# Patient Record
Sex: MALE | Race: WHITE | NOT HISPANIC OR LATINO | URBAN - METROPOLITAN AREA
[De-identification: names, ages, dates, MRNs, and addresses within clinical notes are randomized per-mention and may not be internally consistent; named-entity substitution may affect disease eponyms.]

---

## 2019-05-29 ENCOUNTER — INPATIENT (INPATIENT)
Facility: HOSPITAL | Age: 69
LOS: 0 days | Discharge: HOME | DRG: 727 | End: 2019-05-30
Attending: INTERNAL MEDICINE | Admitting: INTERNAL MEDICINE
Payer: COMMERCIAL

## 2019-05-29 VITALS
RESPIRATION RATE: 18 BRPM | HEIGHT: 70 IN | HEART RATE: 97 BPM | WEIGHT: 175.93 LBS | TEMPERATURE: 98 F | DIASTOLIC BLOOD PRESSURE: 87 MMHG | OXYGEN SATURATION: 95 % | SYSTOLIC BLOOD PRESSURE: 159 MMHG

## 2019-05-29 DIAGNOSIS — Z29.9 ENCOUNTER FOR PROPHYLACTIC MEASURES, UNSPECIFIED: ICD-10-CM

## 2019-05-29 DIAGNOSIS — I10 ESSENTIAL (PRIMARY) HYPERTENSION: ICD-10-CM

## 2019-05-29 DIAGNOSIS — N49.2 INFLAMMATORY DISORDERS OF SCROTUM: ICD-10-CM

## 2019-05-29 DIAGNOSIS — Z91.89 OTHER SPECIFIED PERSONAL RISK FACTORS, NOT ELSEWHERE CLASSIFIED: ICD-10-CM

## 2019-05-29 DIAGNOSIS — I26.99 OTHER PULMONARY EMBOLISM WITHOUT ACUTE COR PULMONALE: ICD-10-CM

## 2019-05-29 DIAGNOSIS — N18.9 CHRONIC KIDNEY DISEASE, UNSPECIFIED: ICD-10-CM

## 2019-05-29 DIAGNOSIS — N50.3 CYST OF EPIDIDYMIS: ICD-10-CM

## 2019-05-29 DIAGNOSIS — R82.71 BACTERIURIA: ICD-10-CM

## 2019-05-29 LAB
ALBUMIN SERPL ELPH-MCNC: 4.7 G/DL — SIGNIFICANT CHANGE UP (ref 3.3–5)
ALP SERPL-CCNC: 91 U/L — SIGNIFICANT CHANGE UP (ref 40–120)
ALT FLD-CCNC: 34 U/L — SIGNIFICANT CHANGE UP (ref 10–45)
ANION GAP SERPL CALC-SCNC: 15 MMOL/L — SIGNIFICANT CHANGE UP (ref 5–17)
APPEARANCE UR: ABNORMAL
APTT BLD: 27.7 SEC — SIGNIFICANT CHANGE UP (ref 27.5–36.3)
AST SERPL-CCNC: 38 U/L — SIGNIFICANT CHANGE UP (ref 10–40)
BASOPHILS # BLD AUTO: 0.03 K/UL — SIGNIFICANT CHANGE UP (ref 0–0.2)
BASOPHILS NFR BLD AUTO: 0.2 % — SIGNIFICANT CHANGE UP (ref 0–2)
BILIRUB SERPL-MCNC: 1 MG/DL — SIGNIFICANT CHANGE UP (ref 0.2–1.2)
BILIRUB UR-MCNC: NEGATIVE — SIGNIFICANT CHANGE UP
BUN SERPL-MCNC: 13 MG/DL — SIGNIFICANT CHANGE UP (ref 7–23)
CALCIUM SERPL-MCNC: 10.1 MG/DL — SIGNIFICANT CHANGE UP (ref 8.4–10.5)
CHLORIDE SERPL-SCNC: 98 MMOL/L — SIGNIFICANT CHANGE UP (ref 96–108)
CO2 SERPL-SCNC: 26 MMOL/L — SIGNIFICANT CHANGE UP (ref 22–31)
COLOR SPEC: YELLOW — SIGNIFICANT CHANGE UP
CREAT SERPL-MCNC: 1.28 MG/DL — SIGNIFICANT CHANGE UP (ref 0.5–1.3)
DIFF PNL FLD: ABNORMAL
EOSINOPHIL # BLD AUTO: 0.05 K/UL — SIGNIFICANT CHANGE UP (ref 0–0.5)
EOSINOPHIL NFR BLD AUTO: 0.3 % — SIGNIFICANT CHANGE UP (ref 0–6)
GLUCOSE SERPL-MCNC: 116 MG/DL — HIGH (ref 70–99)
GLUCOSE UR QL: NEGATIVE — SIGNIFICANT CHANGE UP
HBA1C BLD-MCNC: 5.4 % — SIGNIFICANT CHANGE UP (ref 4–5.6)
HCT VFR BLD CALC: 46.8 % — SIGNIFICANT CHANGE UP (ref 39–50)
HGB BLD-MCNC: 15.8 G/DL — SIGNIFICANT CHANGE UP (ref 13–17)
IMM GRANULOCYTES NFR BLD AUTO: 0.4 % — SIGNIFICANT CHANGE UP (ref 0–1.5)
INR BLD: 1.09 — SIGNIFICANT CHANGE UP (ref 0.88–1.16)
KETONES UR-MCNC: 15 MG/DL
LEUKOCYTE ESTERASE UR-ACNC: NEGATIVE — SIGNIFICANT CHANGE UP
LYMPHOCYTES # BLD AUTO: 1.9 K/UL — SIGNIFICANT CHANGE UP (ref 1–3.3)
LYMPHOCYTES # BLD AUTO: 11.8 % — LOW (ref 13–44)
MCHC RBC-ENTMCNC: 31.7 PG — SIGNIFICANT CHANGE UP (ref 27–34)
MCHC RBC-ENTMCNC: 33.8 GM/DL — SIGNIFICANT CHANGE UP (ref 32–36)
MCV RBC AUTO: 93.8 FL — SIGNIFICANT CHANGE UP (ref 80–100)
MONOCYTES # BLD AUTO: 1.39 K/UL — HIGH (ref 0–0.9)
MONOCYTES NFR BLD AUTO: 8.6 % — SIGNIFICANT CHANGE UP (ref 2–14)
NEUTROPHILS # BLD AUTO: 12.73 K/UL — HIGH (ref 1.8–7.4)
NEUTROPHILS NFR BLD AUTO: 78.7 % — HIGH (ref 43–77)
NITRITE UR-MCNC: NEGATIVE — SIGNIFICANT CHANGE UP
NRBC # BLD: 0 /100 WBCS — SIGNIFICANT CHANGE UP (ref 0–0)
PH UR: 6 — SIGNIFICANT CHANGE UP (ref 5–8)
PLATELET # BLD AUTO: 246 K/UL — SIGNIFICANT CHANGE UP (ref 150–400)
POTASSIUM SERPL-MCNC: 3.8 MMOL/L — SIGNIFICANT CHANGE UP (ref 3.5–5.3)
POTASSIUM SERPL-SCNC: 3.8 MMOL/L — SIGNIFICANT CHANGE UP (ref 3.5–5.3)
PROT SERPL-MCNC: 8.1 G/DL — SIGNIFICANT CHANGE UP (ref 6–8.3)
PROT UR-MCNC: 30 MG/DL
PROTHROM AB SERPL-ACNC: 12.4 SEC — SIGNIFICANT CHANGE UP (ref 10–12.9)
RBC # BLD: 4.99 M/UL — SIGNIFICANT CHANGE UP (ref 4.2–5.8)
RBC # FLD: 13.9 % — SIGNIFICANT CHANGE UP (ref 10.3–14.5)
SODIUM SERPL-SCNC: 139 MMOL/L — SIGNIFICANT CHANGE UP (ref 135–145)
SP GR SPEC: 1.02 — SIGNIFICANT CHANGE UP (ref 1–1.03)
TSH SERPL-MCNC: 1.51 UIU/ML — SIGNIFICANT CHANGE UP (ref 0.35–4.94)
UROBILINOGEN FLD QL: 0.2 E.U./DL — SIGNIFICANT CHANGE UP
WBC # BLD: 16.17 K/UL — HIGH (ref 3.8–10.5)
WBC # FLD AUTO: 16.17 K/UL — HIGH (ref 3.8–10.5)

## 2019-05-29 PROCEDURE — 71046 X-RAY EXAM CHEST 2 VIEWS: CPT | Mod: 26

## 2019-05-29 PROCEDURE — 99223 1ST HOSP IP/OBS HIGH 75: CPT | Mod: GC

## 2019-05-29 PROCEDURE — 93970 EXTREMITY STUDY: CPT | Mod: 26

## 2019-05-29 PROCEDURE — 71275 CT ANGIOGRAPHY CHEST: CPT | Mod: 26

## 2019-05-29 PROCEDURE — 76870 US EXAM SCROTUM: CPT | Mod: 26

## 2019-05-29 PROCEDURE — 99285 EMERGENCY DEPT VISIT HI MDM: CPT

## 2019-05-29 RX ORDER — ENOXAPARIN SODIUM 100 MG/ML
80 INJECTION SUBCUTANEOUS ONCE
Refills: 0 | Status: COMPLETED | OUTPATIENT
Start: 2019-05-29 | End: 2019-05-29

## 2019-05-29 RX ORDER — CEPHALEXIN 500 MG
500 CAPSULE ORAL
Refills: 0 | Status: DISCONTINUED | OUTPATIENT
Start: 2019-05-29 | End: 2019-05-30

## 2019-05-29 RX ORDER — HYDROCHLOROTHIAZIDE 25 MG
25 TABLET ORAL DAILY
Refills: 0 | Status: DISCONTINUED | OUTPATIENT
Start: 2019-05-29 | End: 2019-05-30

## 2019-05-29 RX ORDER — APIXABAN 2.5 MG/1
10 TABLET, FILM COATED ORAL EVERY 12 HOURS
Refills: 0 | Status: DISCONTINUED | OUTPATIENT
Start: 2019-05-30 | End: 2019-05-30

## 2019-05-29 RX ORDER — AMPICILLIN SODIUM AND SULBACTAM SODIUM 250; 125 MG/ML; MG/ML
3 INJECTION, POWDER, FOR SUSPENSION INTRAMUSCULAR; INTRAVENOUS ONCE
Refills: 0 | Status: COMPLETED | OUTPATIENT
Start: 2019-05-29 | End: 2019-05-29

## 2019-05-29 RX ORDER — ZOLPIDEM TARTRATE 10 MG/1
1 TABLET ORAL
Qty: 0 | Refills: 0 | DISCHARGE

## 2019-05-29 RX ORDER — KETOROLAC TROMETHAMINE 30 MG/ML
15 SYRINGE (ML) INJECTION ONCE
Refills: 0 | Status: DISCONTINUED | OUTPATIENT
Start: 2019-05-29 | End: 2019-05-29

## 2019-05-29 RX ADMIN — Medication 15 MILLIGRAM(S): at 15:22

## 2019-05-29 RX ADMIN — Medication 25 MILLIGRAM(S): at 22:55

## 2019-05-29 RX ADMIN — AMPICILLIN SODIUM AND SULBACTAM SODIUM 200 GRAM(S): 250; 125 INJECTION, POWDER, FOR SUSPENSION INTRAMUSCULAR; INTRAVENOUS at 18:21

## 2019-05-29 RX ADMIN — Medication 15 MILLIGRAM(S): at 14:22

## 2019-05-29 RX ADMIN — ENOXAPARIN SODIUM 80 MILLIGRAM(S): 100 INJECTION SUBCUTANEOUS at 17:34

## 2019-05-29 NOTE — H&P ADULT - PROBLEM SELECTOR PLAN 7
F: none  E: Replete K<4, Mg <2 PRN   N: AUSTIN/TLC   DVT: Lovenox 80mg now Eliquis   Code status: Full   Dispo: RMF States SBP 130s at home.   -C/w HCTZ 25mg qd

## 2019-05-29 NOTE — H&P ADULT - PROBLEM SELECTOR PLAN 1
Pt presented with acute pleuritic CP, now with CT of subsegmental PE no right heart strain (neg trops, BNP). VSS on RA. Unprovoked PE though has significant smoking/exposure hx. S/p Lovenox 80mg in ED. As Pt has no evidence of HD compromise/cor pulm will transition to Eliquis (Cre clearance acceptable)   -C/w Eliquis 10mg BID for 7d then 5mg BID  -F/u LE dopplers, echocardiogram Pt presented with acute pleuritic CP, now with CT of subsegmental PE no right heart strain (neg trops, BNP). VSS on RA. Unprovoked PE though has significant smoking/exposure hx. S/p Lovenox 80mg in ED. As Pt has no evidence of HD compromise/cor pulm will transition to Eliquis (Cre clearance acceptable)   -C/w Eliquis 10mg BID for 7d then 5mg BID  -F/u AM EKG  -F/u LE dopplers, echocardiogram

## 2019-05-29 NOTE — ED PROVIDER NOTE - OBJECTIVE STATEMENT
68 yo M with pmh of HTN c/o R upper back pain x 2 days. Pt states pain is worse when he lies on his back or when he takes a deep breath. Took tylenol which helps with the pain. Denies trauma. Reports swimming for 10 min earlier in the day before the pain started but did not do anything out of the ordinary. Denies fever, chills, cough, sob, cp, sweats, Le swelling, travel prior to pain starting. Pt also c/o testicular swelling since this morning. Pt woke up and his scrotum was red and swollen. Denies pain, dysuria, hematuria, discharge. Pt not sexually active.

## 2019-05-29 NOTE — H&P ADULT - PROBLEM SELECTOR PLAN 6
States SBP 130s at home.   -C/w HCTZ 25mg qd UA with protein, ketones, bacteria. No urinary complaints, unlikely UTI.  -UCx sent

## 2019-05-29 NOTE — H&P ADULT - PROBLEM SELECTOR PLAN 9
1) PCP Contacted on Admission: (Y/N) --> Name & Phone #: Bradly Delcid [phone]  2) Date of Contact with PCP:  3) PCP Contacted at Discharge: (Y/N, N/A)  4) Summary of Handoff Given to PCP:   5) Post-Discharge Appointment Date and Location:

## 2019-05-29 NOTE — H&P ADULT - NSHPLABSRESULTS_GEN_ALL_CORE
.  LABS:                         15.8   16.17 )-----------( 246      ( 29 May 2019 14:24 )             46.8         139  |  98  |  13  ----------------------------<  116<H>  3.8   |  26  |  1.28    Ca    10.1      29 May 2019 14:24    TPro  8.1  /  Alb  4.7  /  TBili  1.0  /  DBili  x   /  AST  38  /  ALT  34  /  AlkPhos  91      PT/INR - ( 29 May 2019 14:24 )   PT: 12.4 sec;   INR: 1.09          PTT - ( 29 May 2019 14:24 )  PTT:27.7 sec  Urinalysis Basic - ( 29 May 2019 15:29 )    Color: Yellow / Appearance: SL Cloudy / S.025 / pH: x  Gluc: x / Ketone: 15 mg/dL  / Bili: Negative / Urobili: 0.2 E.U./dL   Blood: x / Protein: 30 mg/dL / Nitrite: NEGATIVE   Leuk Esterase: NEGATIVE / RBC: < 5 /HPF / WBC < 5 /HPF   Sq Epi: x / Non Sq Epi: 0-5 /HPF / Bacteria: Present /HPF      CARDIAC MARKERS ( 29 May 2019 14:24 )  x     / <0.01 ng/mL / x     / x     / x          Serum Pro-Brain Natriuretic Peptide: 70 pg/mL ( @ 14:24)        < from: CT Angio Chest PE Protocol w/ IV Cont (19 @ 15:43) >    IMPRESSION:     Right segmental and subsegmental pulmonary embolus. No right heart strain.    Right lower lobe airspace opacity, probably developing pulmonary infarct.        < end of copied text >    < from: US Testicles (19 @ 16:48) >      IMPRESSION:  No evidence of testicular torsion.    0.3 x 0.2 x 0.2 cm hyperechoic rounded lesion within the left testicle of   uncertain etiology, but suspicious for a lipoma. 0.2 x 0.2 x 0.2 cm   hypoechoic lesion within the right testicle may represent a complex cyst   or a necrotic lesion.  Follow-up MRI may be helpful for further   characterization.    2.5 x 1.5 x 1.9 cm left epididymal tail cyst versus spermatocele.    Scrotal wall thickening and mild hyperemia may represent cellulitis,   correlate clinically.      < end of copied text >

## 2019-05-29 NOTE — H&P ADULT - NSHPPHYSICALEXAM_GEN_ALL_CORE
.  VITAL SIGNS:  T(C): 36.8 (05-29-19 @ 13:31), Max: 36.8 (05-29-19 @ 13:31)  T(F): 98.3 (05-29-19 @ 13:31), Max: 98.3 (05-29-19 @ 13:31)  HR: 97 (05-29-19 @ 13:31) (97 - 97)  BP: 159/87 (05-29-19 @ 13:31) (159/87 - 159/87)  BP(mean): --  RR: 18 (05-29-19 @ 13:31) (18 - 18)  SpO2: 95% (05-29-19 @ 13:31) (95% - 95%)  Wt(kg): --    PHYSICAL EXAM:    Constitutional: WDWN resting comfortably in bed; NAD not wearing NC  Head: NC/AT  Eyes: PERRL, EOMI, anicteric sclera  ENT: no nasal discharge; uvula midline, no oropharyngeal erythema or exudates; dry tongue  Neck: supple; no JVD or thyromegaly  Respiratory: RR 14, no SoB in full sentences, crackles at RLL otherwise CTA no WoB  Cardiac: +S1/S2; RRR; no M/R/G; PMI non-displaced  Gastrointestinal: soft, NT/ND; no rebound or guarding; +BSx4  Genitourinary: deferred   Back: spine midline, no bony tenderness or step-offs; no CVAT B/L  Extremities: WWP, no clubbing or cyanosis; no peripheral edema  Musculoskeletal: NROM x4; no joint swelling, tenderness or erythema  Vascular: 2+ radial, femoral, DP/PT pulses B/L  Dermatologic: skin warm, dry and intact; no rashes, wounds, or scars; very dry sun-tanned skin   Lymphatic: no submandibular or cervical LAD  Neurologic: AAOx3; CNII-XII grossly intact; no focal deficits  Psychiatric: affect and characteristics of appearance, verbalizations, behaviors are appropriate

## 2019-05-29 NOTE — PATIENT PROFILE ADULT - STATED REASON FOR ADMISSION
complained of R sided Axillary/ chest pain that woke him up from sleep 3 days PTA, also scrotal pain and swelling which he noted while staying at his mothers house also noted like a bite to the thigh are near groin

## 2019-05-29 NOTE — PATIENT PROFILE ADULT - NSPROEDALEARNPREF_GEN_A_NUR
group instruction/written material/individual instruction/video/verbal instruction/pictorial/skill demonstration

## 2019-05-29 NOTE — ED PROVIDER NOTE - PHYSICAL EXAMINATION
CONSTITUTIONAL: Well-appearing; well-nourished; in no apparent distress.   HEAD: Normocephalic; atraumatic.   EYES: PERRL; EOM intact; conjunctiva and sclera clear  ENT: normal nose; no rhinorrhea; normal pharynx with no erythema or lesions.   NECK: Supple; non-tender; no LAD  CARDIOVASCULAR: Normal S1, S2; no murmurs, rubs, or gallops. Regular rate and rhythm.   RESPIRATORY: Breathing easily; breath sounds clear and equal bilaterally; no wheezes, rhonchi, or rales.  GI: Soft; non-distended; non-tender; no palpable organomegaly.   : +scrotal swelling  and redness, nontender, no warmth   MSK: FROM at all extremities, normal tone; R upper back non tender  EXT: No cyanosis or edema; N/V intact  SKIN: Normal for age and race; warm; dry; good turgor; no apparent lesions or rash.   NEURO: A & O x 3; face symmetric; grossly unremarkable.   PSYCHOLOGICAL: The patient’s mood and manner are appropriate.

## 2019-05-29 NOTE — H&P ADULT - PROBLEM SELECTOR PLAN 8
1) PCP Contacted on Admission: (Y/N) --> Name & Phone #: Bradly Delcid [phone]  2) Date of Contact with PCP:  3) PCP Contacted at Discharge: (Y/N, N/A)  4) Summary of Handoff Given to PCP:   5) Post-Discharge Appointment Date and Location: F: none  E: Replete K<4, Mg <2 PRN   N: AUSTIN/TLC   DVT: Lovenox 80mg now Eliquis   Code status: Full   Dispo: RMF

## 2019-05-29 NOTE — ED ADULT TRIAGE NOTE - CHIEF COMPLAINT QUOTE
pt c/o R sided back pain since Monday night. pt also states 'I woke up this morning and my testicles are really red and swollen." denies testicular pain. denies urinary symptoms, fever, chest pain, sob.

## 2019-05-29 NOTE — ED PROVIDER NOTE - CLINICAL SUMMARY MEDICAL DECISION MAKING FREE TEXT BOX
70 yo M with pmh of HTN c/o R upper back pain x 2 days. Worse with movements and deep breaths. No cp, sob. Also with scrotal swelling since this morning. VSS. Well appearing. +scrotal swelling  and redness, nontender, no warmth;  FROM at all extremities, normal tone; R upper back non tender; reproduced with lying back 70 yo M with pmh of HTN c/o R upper back pain x 2 days. Worse with movements and deep breaths. No cp, sob. Also with scrotal swelling since this morning. VSS. Well appearing. +scrotal swelling  and redness, nontender, no warmth;  FROM at all extremities, normal tone; R upper back non tender; reproduced with lying back. r/o pna r/o PE r/o MSK pain, r/o UTI r/o epididymitis

## 2019-05-29 NOTE — H&P ADULT - PROBLEM SELECTOR PLAN 2
C/o sudden onset erythema/swelling. U/s with scrotal wall thickening and mild hyperemia may represent cellulitis. No torsion. Afebrile, WBC 16. Exam deferred at this time since Pt in ED. No recent abx or risk factors. S/p Unasyn in ED  -Will ask Night Team to examine scrotum for crepitis once Pt has room   -C/w Keflex for likely nonpurulent cellulitis 500 q6hr for 5d as above

## 2019-05-29 NOTE — ED PROVIDER NOTE - ATTENDING CONTRIBUTION TO CARE
69M with hx of hTN, complaining of R upper back pain, starting on Monday night. pain is worse when he lies down or with a deep breath. Pt took Tylenol which greatly helps pain. No chest pain, no sob, no fever/chils or cough. Pain started prior to traveling. Pt has testicular swelling. not painful no dysuria, not currently sexually active. Pt is well appearing. Testicle is nontender. WBC count elevated, XR shows possible R sided consolidation Plan for US of testicles, plan for CTA of chest.

## 2019-05-29 NOTE — H&P ADULT - PROBLEM SELECTOR PLAN 5
UA with protein, ketones, bacteria. No urinary complaints, unlikely UTI. UA with protein, ketones, bacteria. No urinary complaints, unlikely UTI.  -UCx sent Was told by PCP he has CKD but not to worry about it. Risk factors include HTN. Has protein on UA. No urinary complaints  -F/u A1c

## 2019-05-29 NOTE — H&P ADULT - PROBLEM SELECTOR PLAN 4
Was told by PCP he has CKD but not to worry about it. Risk factors include HTN. Has protein on UA. No urinary complaints  -F/u A1c hyperechoic rounded lesion within the left testicle of   uncertain etiology, but suspicious for a lipoma. 0.2 x 0.2 x 0.2 cm   hypoechoic lesion within the right testicle may represent a complex cyst   or a necrotic lesion.    -Will dicuss with Pt and recommend f/u MRI outpt

## 2019-05-29 NOTE — H&P ADULT - HISTORY OF PRESENT ILLNESS
70 yo M with pmh of HTN c/o R upper back pain x 2 days. Pt states pain is worse when he lies on his back or when he takes a deep breath. Took tylenol which helps with the pain. Denies trauma. Reports swimming for 10 min earlier in the day before the pain started but did not do anything out of the ordinary. Denies fever, chills, cough, sob, cp, sweats, Le swelling, travel prior to pain starting. Pt also c/o testicular swelling since this morning. Pt woke up and his scrotum was red and swollen. Denies pain, dysuria, hematuria, discharge. Pt not sexually active. 69M PMH HTN, CKD? unknown baseline presented for right sided back-axillary chest pain that woke him from sleep 3 days before admission. Pain is sharp, worse with deep breathing and lying on his back and relieved with tylenol.  Denies SoB, CP, HA, blurry vision, leg swelling. Today Pt woke up with testicular pain and swelling starting day of admission and has noticed insect? bite marks on medial thigh so he brought himself in. No trauma, denies  fever, chills, cough, sob, CP/pressure,  sweats, leg swelling, n/v/d dysuria. Endorses chronic constipation, under care of PCP with plans for repeat colonoscopy (last colonoscopy 5yr ago WNL).  Pt lives in [place] and flew to [facility] after pain started. 69M PMH HTN, CKD? unknown baseline presented for right sided back-axillary chest pain that woke him from sleep 3 days before admission. Pain is sharp, worse with deep breathing and lying on his back and relieved with tylenol.  Denies SoB, CP, HA, blurry vision, leg swelling. Today Pt woke up with testicular pain and swelling starting day of admission and has noticed insect? bite marks on medial thigh so he brought himself in. No trauma, denies  fever, chills, cough, sob, CP/pressure,  sweats, leg swelling, n/v/d dysuria. Endorses chronic constipation, under care of PCP with plans for repeat colonoscopy (last colonoscopy 5yr ago WNL).  Pt lives in [place] and flew to [facility] after pain started. Has a active lifestyle (plays tennis 5x/wk) and has not noticed any shortness of breath or changes to baseline routine.      ED Course:   -Vitals: T 98.3, HR 97, 159/87, 18, 95% on RA  -Notable Labs: 16.17%, neutrophilic predom  -CT-PE: Right segmental and subsegmental pulmonary embolus. No right heart strain, RLL airspace opacity, probably developing pulmonary infarct.   -UA: protein, ketones, bacteria   -Testicular u/s: cellulitis? 69M PMH HTN, CKD? unknown baseline presented for right sided back-axillary chest pain that woke him from sleep 3 days before admission. Pain is sharp, worse with deep breathing and lying on his back and relieved with tylenol.  Denies SoB, CP, HA, blurry vision, leg swelling. Today Pt woke up with testicular pain and swelling starting day of admission and has noticed insect? bite marks on medial thigh so he brought himself in. No trauma, denies  fever, chills, cough, sob, CP/pressure,  sweats, leg swelling, n/v/d dysuria. Endorses chronic constipation, under care of PCP with plans for repeat colonoscopy (last colonoscopy 5yr ago WNL).  Pt lives in [place] and flew to [facility] after pain started. Has a active lifestyle (plays tennis 5x/wk) and has not noticed any shortness of breath or changes to baseline routine. No recent illnesses, never hospitalized. Sees PCP yearly.      ED Course:   -Vitals: T 98.3, HR 97, 159/87, 18, 95% on RA  -Notable Labs: 16.17%, neutrophilic predom  -CT-PE: Right segmental and subsegmental pulmonary embolus. No right heart strain, RLL airspace opacity, probably developing pulmonary infarct.   -UA: protein, ketones, bacteria   -Testicular u/s: cellulitis? 69M PMH HTN, CKD? unknown baseline presented for right sided back-axillary chest pain that woke him from sleep 3 days before admission. Pain is sharp, worse with deep breathing and lying on his back and relieved with tylenol.  Denies SoB, CP, HA, blurry vision, leg swelling. Today Pt woke up with testicular pain and swelling starting day of admission and has noticed insect? bite marks on medial thigh so he brought himself in. No trauma, denies  fever, chills, cough, sob, CP/pressure,  sweats, leg swelling, n/v/d dysuria. Endorses chronic constipation, under care of PCP with plans for repeat colonoscopy (last colonoscopy 5yr ago WNL).  Pt lives in [place] and flew to [facility] after pain started. Has a active lifestyle (plays tennis 5x/wk) and has not noticed any shortness of breath or changes to baseline routine. No recent illnesses, never hospitalized. Sees PCP yearly.      ED Course:   -Vitals: T 98.3, HR 97, 159/87, 18, 95% on RA  -Notable Labs: 16.17%, neutrophilic predom  EKG: SNR, no ischemic changes or TWI, normal axis  -CT-PE: Right segmental and subsegmental pulmonary embolus. No right heart strain, RLL airspace opacity, probably developing pulmonary infarct.   -UA: protein, ketones, bacteria   -Testicular u/s: cellulitis?

## 2019-05-29 NOTE — H&P ADULT - ASSESSMENT
69M PMH HTN, CKD? unknown baseline presented for right sided back-axillary chest pain that woke him from sleep 3 days before admission now with CT evidence of unprovoked right segmental PE without right heart strain.

## 2019-05-29 NOTE — H&P ADULT - ATTENDING COMMENTS
patient seen and examined; reviewed: H&P, labs, radiology imaging/ reports, EKG     PE findings as above in addition on  exam pt w/ urticaria like lesions at inner thighs b/l ; also w/ mild  scrotal edema and erythema , nontender to palpation , NO crepitus     1. PE: cause unclear: ordered for NOAC after given lovenox , followup ECHO and dopplers of lower ext   2. cellulitis of scrotum: suspected, possibly from insect bites vs hives from stress of back pain; c/w keflex  3. followup w/ PCP/ urology re: testicular sono  findings       rest of plan as above

## 2019-05-29 NOTE — H&P ADULT - PROBLEM SELECTOR PLAN 3
hyperechoic rounded lesion within the left testicle of   uncertain etiology, but suspicious for a lipoma. 0.2 x 0.2 x 0.2 cm   hypoechoic lesion within the right testicle may represent a complex cyst   or a necrotic lesion.    -Will dicuss with Pt and recommend f/u MRI outpt C/o sudden onset erythema/swelling. U/s with scrotal wall thickening and mild hyperemia may represent cellulitis. No torsion. Afebrile, WBC 16. Exam deferred at this time since Pt in ED. No recent abx or risk factors. S/p Unasyn in ED  -Will ask Night Team to examine scrotum for crepitis once Pt has room   -C/w Keflex for likely nonpurulent cellulitis 500 q6hr for 5d

## 2019-05-29 NOTE — H&P ADULT - NSHPSOCIALHISTORY_GEN_ALL_CORE
-Lives with wife.   -Works out daily.   -Used to work in [employer] in [place], possible exposure to toxic fumes.   -Used to travel to [place] many times a year for work  -Has smoked for 50 years ranging from 1ppd to several cigs a day (last use last year). Social drinker. Past cocaine use in youth.

## 2019-05-29 NOTE — ED ADULT NURSE NOTE - OBJECTIVE STATEMENT
pt having redness/swelling of scrotum since this am ,denies pain , also having pain in right posterior ribcage area since Monday , was swimming that day and pain started that pm , pt is also concerned that he has not had  a normal BM x 2 weeks

## 2019-05-30 VITALS
SYSTOLIC BLOOD PRESSURE: 127 MMHG | HEART RATE: 87 BPM | DIASTOLIC BLOOD PRESSURE: 76 MMHG | RESPIRATION RATE: 18 BRPM | TEMPERATURE: 98 F | OXYGEN SATURATION: 94 %

## 2019-05-30 DIAGNOSIS — I26.99 OTHER PULMONARY EMBOLISM WITHOUT ACUTE COR PULMONALE: ICD-10-CM

## 2019-05-30 LAB
ANION GAP SERPL CALC-SCNC: 13 MMOL/L — SIGNIFICANT CHANGE UP (ref 5–17)
BUN SERPL-MCNC: 21 MG/DL — SIGNIFICANT CHANGE UP (ref 7–23)
CALCIUM SERPL-MCNC: 9.7 MG/DL — SIGNIFICANT CHANGE UP (ref 8.4–10.5)
CHLORIDE SERPL-SCNC: 99 MMOL/L — SIGNIFICANT CHANGE UP (ref 96–108)
CO2 SERPL-SCNC: 27 MMOL/L — SIGNIFICANT CHANGE UP (ref 22–31)
CREAT SERPL-MCNC: 1.36 MG/DL — HIGH (ref 0.5–1.3)
CULTURE RESULTS: NO GROWTH — SIGNIFICANT CHANGE UP
GLUCOSE SERPL-MCNC: 101 MG/DL — HIGH (ref 70–99)
HCT VFR BLD CALC: 41.8 % — SIGNIFICANT CHANGE UP (ref 39–50)
HCV AB S/CO SERPL IA: 0.02 S/CO — SIGNIFICANT CHANGE UP
HCV AB SERPL-IMP: SIGNIFICANT CHANGE UP
HGB BLD-MCNC: 13.8 G/DL — SIGNIFICANT CHANGE UP (ref 13–17)
MAGNESIUM SERPL-MCNC: 2 MG/DL — SIGNIFICANT CHANGE UP (ref 1.6–2.6)
MCHC RBC-ENTMCNC: 30.7 PG — SIGNIFICANT CHANGE UP (ref 27–34)
MCHC RBC-ENTMCNC: 33 GM/DL — SIGNIFICANT CHANGE UP (ref 32–36)
MCV RBC AUTO: 93.1 FL — SIGNIFICANT CHANGE UP (ref 80–100)
NRBC # BLD: 0 /100 WBCS — SIGNIFICANT CHANGE UP (ref 0–0)
PLATELET # BLD AUTO: 216 K/UL — SIGNIFICANT CHANGE UP (ref 150–400)
POTASSIUM SERPL-MCNC: 4.1 MMOL/L — SIGNIFICANT CHANGE UP (ref 3.5–5.3)
POTASSIUM SERPL-SCNC: 4.1 MMOL/L — SIGNIFICANT CHANGE UP (ref 3.5–5.3)
RBC # BLD: 4.49 M/UL — SIGNIFICANT CHANGE UP (ref 4.2–5.8)
RBC # FLD: 14.1 % — SIGNIFICANT CHANGE UP (ref 10.3–14.5)
SODIUM SERPL-SCNC: 139 MMOL/L — SIGNIFICANT CHANGE UP (ref 135–145)
SPECIMEN SOURCE: SIGNIFICANT CHANGE UP
WBC # BLD: 10.82 K/UL — HIGH (ref 3.8–10.5)
WBC # FLD AUTO: 10.82 K/UL — HIGH (ref 3.8–10.5)

## 2019-05-30 PROCEDURE — 93970 EXTREMITY STUDY: CPT

## 2019-05-30 PROCEDURE — 99285 EMERGENCY DEPT VISIT HI MDM: CPT | Mod: 25

## 2019-05-30 PROCEDURE — 85610 PROTHROMBIN TIME: CPT

## 2019-05-30 PROCEDURE — 85730 THROMBOPLASTIN TIME PARTIAL: CPT

## 2019-05-30 PROCEDURE — 81001 URINALYSIS AUTO W/SCOPE: CPT

## 2019-05-30 PROCEDURE — 83036 HEMOGLOBIN GLYCOSYLATED A1C: CPT

## 2019-05-30 PROCEDURE — 71275 CT ANGIOGRAPHY CHEST: CPT

## 2019-05-30 PROCEDURE — 36415 COLL VENOUS BLD VENIPUNCTURE: CPT

## 2019-05-30 PROCEDURE — 86803 HEPATITIS C AB TEST: CPT

## 2019-05-30 PROCEDURE — 83880 ASSAY OF NATRIURETIC PEPTIDE: CPT

## 2019-05-30 PROCEDURE — 83735 ASSAY OF MAGNESIUM: CPT

## 2019-05-30 PROCEDURE — 84484 ASSAY OF TROPONIN QUANT: CPT

## 2019-05-30 PROCEDURE — 76870 US EXAM SCROTUM: CPT

## 2019-05-30 PROCEDURE — 96372 THER/PROPH/DIAG INJ SC/IM: CPT | Mod: XU

## 2019-05-30 PROCEDURE — 71046 X-RAY EXAM CHEST 2 VIEWS: CPT

## 2019-05-30 PROCEDURE — 84443 ASSAY THYROID STIM HORMONE: CPT

## 2019-05-30 PROCEDURE — 99239 HOSP IP/OBS DSCHRG MGMT >30: CPT

## 2019-05-30 PROCEDURE — 93306 TTE W/DOPPLER COMPLETE: CPT

## 2019-05-30 PROCEDURE — 93306 TTE W/DOPPLER COMPLETE: CPT | Mod: 26

## 2019-05-30 PROCEDURE — 87086 URINE CULTURE/COLONY COUNT: CPT

## 2019-05-30 PROCEDURE — 96374 THER/PROPH/DIAG INJ IV PUSH: CPT | Mod: XU

## 2019-05-30 PROCEDURE — 85025 COMPLETE CBC W/AUTO DIFF WBC: CPT

## 2019-05-30 PROCEDURE — 80053 COMPREHEN METABOLIC PANEL: CPT

## 2019-05-30 PROCEDURE — 80048 BASIC METABOLIC PNL TOTAL CA: CPT

## 2019-05-30 PROCEDURE — 85027 COMPLETE CBC AUTOMATED: CPT

## 2019-05-30 RX ORDER — APIXABAN 2.5 MG/1
2 TABLET, FILM COATED ORAL
Qty: 0 | Refills: 0 | DISCHARGE
Start: 2019-05-30

## 2019-05-30 RX ORDER — CEPHALEXIN 500 MG
1 CAPSULE ORAL
Qty: 28 | Refills: 0
Start: 2019-05-30 | End: 2019-06-05

## 2019-05-30 RX ORDER — APIXABAN 2.5 MG/1
2 TABLET, FILM COATED ORAL
Qty: 74 | Refills: 0
Start: 2019-05-30

## 2019-05-30 RX ADMIN — Medication 500 MILLIGRAM(S): at 06:23

## 2019-05-30 RX ADMIN — Medication 500 MILLIGRAM(S): at 00:33

## 2019-05-30 RX ADMIN — Medication 500 MILLIGRAM(S): at 12:04

## 2019-05-30 RX ADMIN — APIXABAN 10 MILLIGRAM(S): 2.5 TABLET, FILM COATED ORAL at 06:23

## 2019-05-30 RX ADMIN — Medication 25 MILLIGRAM(S): at 06:22

## 2019-05-30 NOTE — DISCHARGE NOTE NURSING/CASE MANAGEMENT/SOCIAL WORK - NSDCDPATPORTLINK_GEN_ALL_CORE
You can access the [facility], offered by [facility], by registering with the following website: [email or web address]

## 2019-05-30 NOTE — DISCHARGE NOTE PROVIDER - HOSPITAL COURSE
69M PMH HTN, CKD? unknown baseline presented for right sided back-axillary chest pain that woke him from sleep 3 days before admission, as well as testicular erythema.  CT imaging significant for R segmental and subsegmental pulmonary embolism with no right heart strain supported by negative echocardiogram findings. Patient was started on Eliquis for unprovoked pulmonary embolism and oral keflex for scrotal cellulitis for a total 7 day course. Goals of management discussed with patient, verbalized understanding, deemed stable for discharge with outpatient follow up.

## 2019-05-30 NOTE — DISCHARGE NOTE PROVIDER - CARE PROVIDER_API CALL
DAPHNEY CAZARES  [address]  [address]  [place], [place]  Phone: [phone]  Fax: [phone]  Follow Up Time:

## 2019-05-30 NOTE — DISCHARGE NOTE PROVIDER - NSDCACTIVITY_GEN_ALL_CORE
Return to Work/School allowed/Sex allowed/Driving allowed/Stairs allowed/Showering allowed/Walking - Outdoors allowed/Walking - Indoors allowed/Bathing allowed/No heavy lifting/straining

## 2019-05-30 NOTE — DISCHARGE NOTE PROVIDER - NSDCCPCAREPLAN_GEN_ALL_CORE_FT
PRINCIPAL DISCHARGE DIAGNOSIS  Diagnosis: Pulmonary embolism  Assessment and Plan of Treatment: You came into the hosptial with chest and back pain. We did a CT scan that showed that you had a blood clot in one of the vessels of your lungs. PRINCIPAL DISCHARGE DIAGNOSIS  Diagnosis: Pulmonary embolism  Assessment and Plan of Treatment: You came into the hosptial with chest and back pain. We did a CT scan that showed that you had a blood clot in one of the vessels of your lungs. We started you on Eliquis (a medication that stops the formation of new clots whilst your body resorbs the old one). For the first 7 days, you willbe taking 10mg twice a day and then you will transition to 5mg twice a day for continued therapy. Please follow up with your primary care physician for continued management of this condition.      SECONDARY DISCHARGE DIAGNOSES  Diagnosis: Cellulitis of scrotum  Assessment and Plan of Treatment: You came into the hospital with scrotal erythema, we performed an ultrasound that showed scrotal wall thickening that was conistent with celluliitis. We started you on Keflex (an antibiotic for cellulitis) for 7 days total. Please continue to take your medication as prescribed and continue to follow up with your primary care physician for continued management of this condition.    Diagnosis: Epididymal cyst  Assessment and Plan of Treatment: we performed an ultrasoud of the area that showed a possible small lipoma (collection of fat tissue) on the L testicle. You will need a follow up MRI to determine the specific etiology. As well as    Diagnosis: CKD (chronic kidney disease)  Assessment and Plan of Treatment: Consistent with you past medical hisotry, please continue to follow up with your primary care physician for continued management of this condition    Diagnosis: Hypertension  Assessment and Plan of Treatment: Consistent with your past medical history, please continue to take your medications as prescibed and continue to follow up with your primary care physician for continued management of this condition.

## 2019-05-30 NOTE — DISCHARGE NOTE PROVIDER - NSDCFUADDAPPT_GEN_ALL_CORE_FT
Please follow up with your primary care physician Dr Delcid on Tuesday June 4th @1045 am    Please ask you Primary Care physician to schedule an appointment with a Hematologist for continued management of your pulmonary embolism.

## 2019-05-30 NOTE — DISCHARGE NOTE PROVIDER - PROVIDER TOKENS
FREE:[LAST:[SMITH],FIRST:[DAPHNEY],PHONE:[phone]],FAX:[phone]],ADDRESS:[[address] [place] [address]]]

## 2019-06-05 DIAGNOSIS — N49.2 INFLAMMATORY DISORDERS OF SCROTUM: ICD-10-CM

## 2019-06-05 DIAGNOSIS — I12.9 HYPERTENSIVE CHRONIC KIDNEY DISEASE WITH STAGE 1 THROUGH STAGE 4 CHRONIC KIDNEY DISEASE, OR UNSPECIFIED CHRONIC KIDNEY DISEASE: ICD-10-CM

## 2019-06-05 DIAGNOSIS — Z87.891 PERSONAL HISTORY OF NICOTINE DEPENDENCE: ICD-10-CM

## 2019-06-05 DIAGNOSIS — R78.81 BACTEREMIA: ICD-10-CM

## 2019-06-05 DIAGNOSIS — I26.99 OTHER PULMONARY EMBOLISM WITHOUT ACUTE COR PULMONALE: ICD-10-CM

## 2019-06-05 DIAGNOSIS — N18.9 CHRONIC KIDNEY DISEASE, UNSPECIFIED: ICD-10-CM

## 2019-06-05 DIAGNOSIS — N50.3 CYST OF EPIDIDYMIS: ICD-10-CM

## 2024-10-28 NOTE — DISCHARGE NOTE NURSING/CASE MANAGEMENT/SOCIAL WORK - HAS THE PATIENT USED TOBACCO IN THE PAST 30 DAYS?
Quality 130: Documentation Of Current Medications In The Medical Record: Current Medications Documented Detail Level: Detailed No Quality 226: Preventive Care And Screening: Tobacco Use: Screening And Cessation Intervention: Patient screened for tobacco use and is an ex/non-smoker